# Patient Record
Sex: MALE | Race: WHITE
[De-identification: names, ages, dates, MRNs, and addresses within clinical notes are randomized per-mention and may not be internally consistent; named-entity substitution may affect disease eponyms.]

---

## 2019-11-02 ENCOUNTER — HOSPITAL ENCOUNTER (EMERGENCY)
Dept: HOSPITAL 41 - JD.ED | Age: 69
Discharge: HOME | End: 2019-11-02
Payer: MEDICARE

## 2019-11-02 DIAGNOSIS — Z88.0: ICD-10-CM

## 2019-11-02 DIAGNOSIS — K52.9: Primary | ICD-10-CM

## 2019-11-02 DIAGNOSIS — I10: ICD-10-CM

## 2019-11-02 DIAGNOSIS — Z79.01: ICD-10-CM

## 2019-11-02 DIAGNOSIS — Z86.718: ICD-10-CM

## 2019-11-02 DIAGNOSIS — Z90.89: ICD-10-CM

## 2019-11-02 PROCEDURE — 74177 CT ABD & PELVIS W/CONTRAST: CPT

## 2019-11-02 PROCEDURE — 86140 C-REACTIVE PROTEIN: CPT

## 2019-11-02 PROCEDURE — 99284 EMERGENCY DEPT VISIT MOD MDM: CPT

## 2019-11-02 PROCEDURE — 36415 COLL VENOUS BLD VENIPUNCTURE: CPT

## 2019-11-02 PROCEDURE — 96361 HYDRATE IV INFUSION ADD-ON: CPT

## 2019-11-02 PROCEDURE — 80053 COMPREHEN METABOLIC PANEL: CPT

## 2019-11-02 PROCEDURE — 96375 TX/PRO/DX INJ NEW DRUG ADDON: CPT

## 2019-11-02 PROCEDURE — 96374 THER/PROPH/DIAG INJ IV PUSH: CPT

## 2019-11-02 PROCEDURE — 81001 URINALYSIS AUTO W/SCOPE: CPT

## 2019-11-02 PROCEDURE — 85007 BL SMEAR W/DIFF WBC COUNT: CPT

## 2019-11-02 PROCEDURE — 85027 COMPLETE CBC AUTOMATED: CPT

## 2019-11-02 NOTE — CT
CT abdomen and pelvis

 

Technique: Multiple axial sections were obtained from above the dome 

of the diaphragm inferiorly through the pubic symphysis.  Intravenous 

and oral contrast has been given.  Delayed images were also obtained 

through the bladder.

 

Findings: Diffuse bowel wall thickening is seen within the descending 

colon with surrounding inflammatory change.  Numerous diverticuli are 

seen within the descending and sigmoid colon.  Due to the long segment

 of involvement, findings are most likely due to a nonspecific 

colitis.

 

Visualized lung bases show nothing acute.

 

Liver contains no focal abnormality.  Small hiatal hernia is noted.  

Spleen appears within normal limits.  Adrenal glands show no nodule.  

Right kidney is smaller than the left kidney which appears to be a 

chronic finding.  Cyst also noted within the right kidney measuring 

3.7 cm in size.

 

Aorta shows atherosclerotic calcification without aneurysm.  

Atherosclerotic calcification continues in the iliac vessels.  No 

retroperitoneal adenopathy or mesenteric abnormalities are seen.  No 

pelvic mass or adenopathy is seen.

 

Appendix not visualized with certainty.

 

Delayed images shows contrast within the bladder.

 

Bone window settings were reviewed which shows disc space narrowing at

 L4-5 and L5-S1 with endplate osteophytes.  No acute osseous 

abnormality is appreciated.

 

Impression:

1.  Bowel wall thickening within the descending colon.  Numerous 

diverticuli are seen within the descending and sigmoid colon.  

Surrounding inflammatory change seen around the left colon.  As 

mentioned above, due to the long segment of colon involvement, 

findings are most likely due to a nonspecific colitis.

2.  Other findings which are believed to be incidental as noted above.

 

Diagnostic code #3
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
